# Patient Record
Sex: FEMALE | Race: WHITE | NOT HISPANIC OR LATINO | Employment: UNEMPLOYED | URBAN - METROPOLITAN AREA
[De-identification: names, ages, dates, MRNs, and addresses within clinical notes are randomized per-mention and may not be internally consistent; named-entity substitution may affect disease eponyms.]

---
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
## 2023-01-27 ENCOUNTER — MYC MEDICAL ADVICE (OUTPATIENT)
Dept: NEUROLOGY | Facility: CLINIC | Age: 18
End: 2023-01-27
Payer: COMMERCIAL

## 2023-02-10 ENCOUNTER — VIRTUAL VISIT (OUTPATIENT)
Dept: NEUROLOGY | Facility: CLINIC | Age: 18
End: 2023-02-10
Payer: COMMERCIAL

## 2023-02-10 DIAGNOSIS — G43.009 MIGRAINE WITHOUT AURA AND WITHOUT STATUS MIGRAINOSUS, NOT INTRACTABLE: ICD-10-CM

## 2023-02-10 DIAGNOSIS — R51.9 CHRONIC DAILY HEADACHE: ICD-10-CM

## 2023-02-10 PROCEDURE — 99214 OFFICE O/P EST MOD 30 MIN: CPT | Mod: VID | Performed by: PSYCHIATRY & NEUROLOGY

## 2023-02-10 RX ORDER — METOCLOPRAMIDE 10 MG/1
10 TABLET ORAL 3 TIMES DAILY PRN
Qty: 20 TABLET | Refills: 11 | Status: SHIPPED | OUTPATIENT
Start: 2023-02-10 | End: 2023-11-08

## 2023-02-10 RX ORDER — CEFUROXIME AXETIL 250 MG/1
6 TABLET ORAL
Qty: 9 KIT | Refills: 11 | Status: SHIPPED | OUTPATIENT
Start: 2023-02-10 | End: 2023-08-25

## 2023-02-10 RX ORDER — MEMANTINE HYDROCHLORIDE 5 MG/1
10 TABLET ORAL 2 TIMES DAILY
Qty: 120 TABLET | Refills: 11 | Status: SHIPPED | OUTPATIENT
Start: 2023-02-10 | End: 2023-11-08

## 2023-02-10 ASSESSMENT — HEADACHE IMPACT TEST (HIT 6)
WHEN YOU HAVE HEADACHES HOW OFTEN IS THE PAIN SEVERE: VERY OFTEN
HOW OFTEN HAVE YOU FELT FED UP OR IRRITATED BECAUSE OF YOUR HEADACHES: ALWAYS
WHEN YOU HAVE A HEADACHE HOW OFTEN DO YOU WISH YOU COULD LIE DOWN: ALWAYS
HOW OFTEN HAVE YOU FELT TOO TIRED TO WORK BECAUSE OF YOUR HEADACHES: VERY OFTEN
HIT6 TOTAL SCORE: 70
HOW OFTEN DID HEADACHS LIMIT CONCENTRATION ON WORK OR DAILY ACTIVITY: VERY OFTEN
HOW OFTEN DO HEADACHES LIMIT YOUR DAILY ACTIVITIES: VERY OFTEN

## 2023-02-10 ASSESSMENT — MIGRAINE DISABILITY ASSESSMENT (MIDAS)
HOW MANY DAYS DID YOU MISS WORK OR SCHOOL BECAUSE OF HEADACHES: 4
TOTAL SCORE: 36
HOW OFTEN WERE SOCIAL ACTIVITIES MISSED DUE TO HEADACHES: 4
ON A SCALE FROM 0-10 ON AVERAGE HOW PAINFUL WERE HEADACHES: 6
HOW MANY DAYS IN THE PAST 3 MONTHS HAVE YOU HAD A HEADACHE: 5
HOW MANY DAYS WAS HOUSEWORK PRODUCTIVITY CUT IN HALF DUE TO HEADACHES: 3
HOW MANY DAYS DID YOU NOT DO HOUSEWORK BECAUSE OF HEADACHES: 10
HOW MANY DAYS WAS YOUR PRODUCTIVITY CUT IN HALF BECAUSE OF HEADACHES: 15

## 2023-02-10 NOTE — LETTER
2/10/2023         RE: Marine Garcia  [address]  [place] [address]        Dear Colleague,    Thank you for referring your patient, Marine Garcia, to the [facility] NEUROLOGY CLINICS [facility]. Please see a copy of my visit note below.    Video-Visit Details    Type of service:  Video Visit    Video Start Time (time video started): 1:46pm    Video End Time (time video stopped): 2:02pm    Originating Location (pt. Location): Home        Distant Location (provider location):  On-site    Mode of Communication:  Video Conference via [facility]    Headache Neurology Progress Note  February 10, 2023    Subjective:    Marine Garcia returns for follow up of chronic migraine.  She returns today after the birth of her child in November 2022.  She is currently breast-feeding.  Her headaches have worsened but are less frequent.    Today, she reports that she continues to have headaches occasionally. She has 15/30 headache days per month, with 10/30 severe headaches.     She takes metoclopramide x 2, Advil Migraine as needed for the more severe headache.    She reports memantine and Aimovig have been helpful.     She is starting iron and vitamin D supplementation for deficiencies.    She was dizzy on anti-hypertensives previously. Topiramate stopped working previously. TCAs will be avoided due to caring for a baby.    Daily medications:  Propranolol- TID (20mg, 20mg,10mg)  Topiramate, up to 150mg daily  Verapamil- BID (20mg, 40mg)  Emgality 120 mg monthly  Prednisone- 10 mg  Acetazolamide- 250 mg BID for 5 days, menstrual migraines.  Pramipexole- 0.25mg  Gabapentin- 300mg TID  Aimovig 140mg monthly (currently on)     Fluoxetine and duloxetine- no longer on    Objective:    Vitals: There were no vitals taken for this visit.  General: Cooperative, NAD  Neurologic:  Mental Status: Fully alert, attentive and oriented. Speech clear and fluent.   Cranial Nerves: Facial movements  symmetric.   Motor: No abnormal movements.      Assessment/Plan:   Marine Garcia is a 17 year old woman who returns for follow-up of chronic migraine with possible visual aura.  Overall, her migraine disease was previously well controlled with Aimovig and memantine, but Aimovig was stopped during pregnancy.  She is currently breast-feeding she is experienced worsening of her migraine attacks since the birth of her child.     For acute management,  she will continue over-the-counter treatments, metoclopramide, and could add back sumatriptan as needed.  -I have provided refills today.    Her headache frequency and severity warrant prevention.  We discussed increasing her dose of memantine by 5 mg each week to a maximum dose of 10 mg twice daily.  -I have provided an updated prescription for the higher dose of memantine.  -I advised her to continue holding on Aimovig until she has completed breast-feeding, due to lack of safety data.  -We discussed using magnesium, riboflavin as possible adjunct treatment if needed.  -Cefaly antimigraine device could also be considered as an adjunct.  -I offered a trial of botulinum toxin injections using a chronic migraine protocol.  She would potentially be interested in this if there were a local injector.  She will call her local clinic and see if this is offered.    I will plan to see her back in 6 months to monitor her progress, or sooner if needed.  She will let me know via [employer] if there is any difficulty increasing memantine.    Kalpana Marshall MD  Neurology               Again, thank you for allowing me to participate in the care of your patient.        Sincerely,        Kalpana Marshall MD

## 2023-02-10 NOTE — NURSING NOTE
Is the patient currently in the state of [place]? YES    Visit mode:VIDEO    If the visit is dropped, the patient can be reconnected by: TELEPHONE VISIT: Phone number: [phone]    Will anyone else be joining the visit? NO      How would you like to obtain your AVS? MyChart    Are changes needed to the allergy or medication list? NO    Comments or concerns regarding today's visit:

## 2023-02-10 NOTE — PROGRESS NOTES
Video-Visit Details    Type of service:  Video Visit    Video Start Time (time video started): 1:46pm    Video End Time (time video stopped): 2:02pm    Originating Location (pt. Location): Home        Distant Location (provider location):  On-site    Mode of Communication:  Video Conference via [facility]    Headache Neurology Progress Note  February 10, 2023    Subjective:    Marine Garcia returns for follow up of chronic migraine.  She returns today after the birth of her child in November 2022.  She is currently breast-feeding.  Her headaches have worsened but are less frequent.    Today, she reports that she continues to have headaches occasionally. She has 15/30 headache days per month, with 10/30 severe headaches.     She takes metoclopramide x 2, Advil Migraine as needed for the more severe headache.    She reports memantine and Aimovig have been helpful.     She is starting iron and vitamin D supplementation for deficiencies.    She was dizzy on anti-hypertensives previously. Topiramate stopped working previously. TCAs will be avoided due to caring for a baby.    Daily medications:  Propranolol- TID (20mg, 20mg,10mg)  Topiramate, up to 150mg daily  Verapamil- BID (20mg, 40mg)  Emgality 120 mg monthly  Prednisone- 10 mg  Acetazolamide- 250 mg BID for 5 days, menstrual migraines.  Pramipexole- 0.25mg  Gabapentin- 300mg TID  Aimovig 140mg monthly (currently on)     Fluoxetine and duloxetine- no longer on    Objective:    Vitals: There were no vitals taken for this visit.  General: Cooperative, NAD  Neurologic:  Mental Status: Fully alert, attentive and oriented. Speech clear and fluent.   Cranial Nerves: Facial movements symmetric.   Motor: No abnormal movements.      Assessment/Plan:   Marine Garcia is a 17 year old woman who returns for follow-up of chronic migraine with possible visual aura.  Overall, her migraine disease was previously well controlled with Aimovig  and memantine, but Aimovig was stopped during pregnancy.  She is currently breast-feeding she is experienced worsening of her migraine attacks since the birth of her child.     For acute management,  she will continue over-the-counter treatments, metoclopramide, and could add back sumatriptan as needed.  -I have provided refills today.    Her headache frequency and severity warrant prevention.  We discussed increasing her dose of memantine by 5 mg each week to a maximum dose of 10 mg twice daily.  -I have provided an updated prescription for the higher dose of memantine.  -I advised her to continue holding on Aimovig until she has completed breast-feeding, due to lack of safety data.  -We discussed using magnesium, riboflavin as possible adjunct treatment if needed.  -Cefaly antimigraine device could also be considered as an adjunct.  -I offered a trial of botulinum toxin injections using a chronic migraine protocol.  She would potentially be interested in this if there were a local injector.  She will call her local clinic and see if this is offered.    I will plan to see her back in 6 months to monitor her progress, or sooner if needed.  She will let me know via [employer] if there is any difficulty increasing memantine.    Kalpana Marshall MD  Neurology

## 2023-05-08 ENCOUNTER — HEALTH MAINTENANCE LETTER (OUTPATIENT)
Age: 18
End: 2023-05-08

## 2023-06-29 ENCOUNTER — TELEPHONE (OUTPATIENT)
Dept: NEUROLOGY | Facility: CLINIC | Age: 18
End: 2023-06-29
Payer: COMMERCIAL

## 2023-06-29 NOTE — TELEPHONE ENCOUNTER
Called patient to advise that we need her current insurance card uploaded to her my chart. Patient states that should not be an issue and she will upload it no later than today. Advised patient that this should be just fine and if any other questions we will reach back out.       Brianna Sanchez MA

## 2023-08-17 ENCOUNTER — TELEPHONE (OUTPATIENT)
Dept: NEUROLOGY | Facility: CLINIC | Age: 18
End: 2023-08-17

## 2023-08-17 NOTE — TELEPHONE ENCOUNTER
Prior Authorization Retail Medication Request     Medication/Dose: SUMAtriptan (IMITREX STATDOSE) 6 MG/0.5ML pen injector kit  ICD code (if different than what is on RX):    G43.009  Previously Tried and Failed:  Ibuprofen, Sumatriptan (50 & 100mg) tabs and intranasal spray  Naratriptan- 2.5mg tab  zolmatriptan- 5 mg tab  Eletriptan- 40 mg tab  Frovatriptan- 2.5 mg tab  almotriptan- 12.5 mg tab  Dihydroergotamine 4mg/ml nasal spray  Rizatriptan- 10 mg tab   Rationale:  migraine  She has used sumatriptan injection with good relief of migraine since June 2021    Insurance Name:  [facility]  Insurance ID:  [id number]         Pharmacy Information (if different than what is on RX)  Name:    Phone:

## 2023-08-22 NOTE — TELEPHONE ENCOUNTER
Central Prior Authorization Team   Phone: [phone]    PA Initiation    Medication: SUMATRIPTAN SUCCINATE 6 MG/0.5ML SC SOAJ  Insurance Company: [employer] - Phone [phone] Fax [phone]  Pharmacy Filling the Rx: [facility] [address] [place] [address] [address] [facility]  Filling Pharmacy Phone: [phone]  Filling Pharmacy Fax:    Start Date: 8/22/2023

## 2023-08-25 DIAGNOSIS — G43.009 MIGRAINE WITHOUT AURA AND WITHOUT STATUS MIGRAINOSUS, NOT INTRACTABLE: ICD-10-CM

## 2023-08-25 RX ORDER — CEFUROXIME AXETIL 250 MG/1
6 TABLET ORAL
Qty: 2 ML | Refills: 11 | Status: SHIPPED | OUTPATIENT
Start: 2023-08-25 | End: 2023-11-08

## 2023-08-25 NOTE — TELEPHONE ENCOUNTER
Prior Authorization Not Needed per Insurance     Plan pays for 2ml (4 pens) per 28 days. There is a paid claim at the pharmacy for this on 08/17/2023.   If patient needs more than this, we would need a Letter of Medical Necessity explaining rationale, as a larger Quantity has been denied.      Medication: SUMATRIPTAN SUCCINATE 6 MG/0.5ML SC SOAJ  Insurance Company: [employer] - Phone [phone] Fax [phone]  Expected CoPay:      Pharmacy Filling the Rx: [facility] [address] [place] [address] - [address] [facility]  Pharmacy Notified: No  Patient Notified: No

## 2023-08-28 NOTE — TELEPHONE ENCOUNTER
Hi Debbie,    I resent script for 2 mL (4 injections per month). If she is using more, we will have to do a LMN. She should let us know if this is needed.    Kalpana Marshall MD     LDVM on descript VM regarding insurance covering only 4 pends per month.     RN advised pt to call us back or send us a myChart if she is requiring more than that.     Maribel STEVENS RN, BSN  [facility]

## 2023-08-29 ENCOUNTER — TELEPHONE (OUTPATIENT)
Dept: NEUROLOGY | Facility: CLINIC | Age: 18
End: 2023-08-29

## 2023-08-29 NOTE — TELEPHONE ENCOUNTER
Prior Authorization Retail Medication Request    Medication/Dose: SUMAtriptan (IMITREX STATDOSE) 6 MG/0.5ML pen injector kit   ICD code (if different than what is on RX):    Previously Tried and Failed:  See Chart  Rationale:  see Chart    Insurance Name:  [employer]  Insurance ID:  [id number]      Pharmacy Information (if different than what is on RX)  Name:    Phone:

## 2023-08-31 NOTE — TELEPHONE ENCOUNTER
Central Prior Authorization Team   Phone: [phone]    PA Initiation    Medication: SUMATRIPTAN SUCCINATE 6 MG/0.5ML SC SOAJ  Insurance Company: [employer] - Phone [phone] Fax [phone]  Pharmacy Filling the Rx: [facility] [address] [place] [address] [address] [facility]  Filling Pharmacy Phone: [phone]  Filling Pharmacy Fax:    Start Date: 8/31/2023

## 2023-09-26 NOTE — TELEPHONE ENCOUNTER
Called pharmacy, spoke to pharmacist. No P/A is needed. They just had to switch to Brand Name Imitrex and rx processed. No P/A needed.

## 2023-09-28 DIAGNOSIS — G43.009 MIGRAINE WITHOUT AURA AND WITHOUT STATUS MIGRAINOSUS, NOT INTRACTABLE: ICD-10-CM

## 2023-09-28 RX ORDER — ERENUMAB-AOOE 140 MG/ML
140 INJECTION, SOLUTION SUBCUTANEOUS
Qty: 1 ML | Refills: 11 | Status: SHIPPED | OUTPATIENT
Start: 2023-09-28 | End: 2023-11-08

## 2023-09-29 ENCOUNTER — TELEPHONE (OUTPATIENT)
Dept: NEUROLOGY | Facility: CLINIC | Age: 18
End: 2023-09-29
Payer: COMMERCIAL

## 2023-09-29 NOTE — TELEPHONE ENCOUNTER
Prior Authorization Retail Medication Request    Medication/Dose: AIMOVIG 140 MG/ML injection 1 mL  ICD code (if different than what is on RX):    Previously Tried and Failed:  Sumatriptan (50 & 100mg) tabs and intranasal spray  Naratriptan- 2.5mg tab  zolmatriptan- 5 mg tab  Eletriptan- 40 mg tab  Frovatriptan- 2.5 mg tab  almotriptan- 12.5 mg tab  Dihydroergotamine 4mg/ml nasal spray  Rizatriptan- 10 mg tab   Rationale:  migraine - restarting Aimovig     Insurance Name:  [facility]   Insurance ID:  [id number]       Pharmacy Information (if different than what is on RX)  Name:    Phone:

## 2023-10-04 NOTE — TELEPHONE ENCOUNTER
Central Prior Authorization Team   Phone: [phone]    PA Initiation    Medication: AIMOVIG 140 MG/ML injection 1 mL  Insurance Company: [facility] - Phone [phone] Fax [phone]  Pharmacy Filling the Rx: [facility] [address] [place] [address] [address] [facility]  Filling Pharmacy Phone: [phone]  Filling Pharmacy Fax:    Start Date: 10/4/2023

## 2023-10-05 NOTE — TELEPHONE ENCOUNTER
PRIOR AUTHORIZATION DENIED    Medication: AIMOVIG 140 MG/ML injection 1 mL-PA DENIED    Denial Date: 10/4/2023    Denial Rational:  Insurance states that patient must tried/failed Two preferred alternatives: Emgality and Ajovy.           Appeal Information:

## 2023-10-06 ENCOUNTER — TELEPHONE (OUTPATIENT)
Dept: NEUROLOGY | Facility: CLINIC | Age: 18
End: 2023-10-06
Payer: COMMERCIAL

## 2023-10-06 NOTE — TELEPHONE ENCOUNTER
Per Dr. Marshall, pt to be seen virtually to discuss medication switch.  Sent pt a my chart message.

## 2023-10-06 NOTE — TELEPHONE ENCOUNTER
Please schedule pt with Dr. Marshall virtually on Wednesday November 8th at 12:30. Pt is aware.  Once scheduled, please remove hold. Thank you.

## 2023-11-04 ASSESSMENT — HEADACHE IMPACT TEST (HIT 6)
HOW OFTEN DO HEADACHES LIMIT YOUR DAILY ACTIVITIES: VERY OFTEN
HOW OFTEN DID HEADACHS LIMIT CONCENTRATION ON WORK OR DAILY ACTIVITY: ALWAYS
WHEN YOU HAVE A HEADACHE HOW OFTEN DO YOU WISH YOU COULD LIE DOWN: ALWAYS
HIT6 TOTAL SCORE: 74
WHEN YOU HAVE HEADACHES HOW OFTEN IS THE PAIN SEVERE: VERY OFTEN
HOW OFTEN HAVE YOU FELT TOO TIRED TO WORK BECAUSE OF YOUR HEADACHES: ALWAYS
HOW OFTEN HAVE YOU FELT FED UP OR IRRITATED BECAUSE OF YOUR HEADACHES: ALWAYS

## 2023-11-08 ENCOUNTER — VIRTUAL VISIT (OUTPATIENT)
Dept: NEUROLOGY | Facility: CLINIC | Age: 18
End: 2023-11-08
Payer: COMMERCIAL

## 2023-11-08 ENCOUNTER — TELEPHONE (OUTPATIENT)
Dept: NEUROLOGY | Facility: CLINIC | Age: 18
End: 2023-11-08

## 2023-11-08 DIAGNOSIS — G43.009 MIGRAINE WITHOUT AURA AND WITHOUT STATUS MIGRAINOSUS, NOT INTRACTABLE: ICD-10-CM

## 2023-11-08 DIAGNOSIS — R51.9 CHRONIC DAILY HEADACHE: ICD-10-CM

## 2023-11-08 PROCEDURE — 99214 OFFICE O/P EST MOD 30 MIN: CPT | Mod: VID | Performed by: PSYCHIATRY & NEUROLOGY

## 2023-11-08 RX ORDER — ONDANSETRON 4 MG/1
4 TABLET, ORALLY DISINTEGRATING ORAL EVERY 6 HOURS PRN
Qty: 20 TABLET | Refills: 11 | Status: SHIPPED | OUTPATIENT
Start: 2023-11-08

## 2023-11-08 RX ORDER — MEMANTINE HYDROCHLORIDE 10 MG/1
10 TABLET ORAL 2 TIMES DAILY
Qty: 60 TABLET | Refills: 11 | Status: SHIPPED | OUTPATIENT
Start: 2023-11-08

## 2023-11-08 RX ORDER — CEFUROXIME AXETIL 250 MG/1
6 TABLET ORAL
Qty: 9 ML | Refills: 11 | Status: SHIPPED | OUTPATIENT
Start: 2023-11-08

## 2023-11-08 RX ORDER — FREMANEZUMAB-VFRM 225 MG/1.5ML
1.5 INJECTION SUBCUTANEOUS
Qty: 1.5 ML | Refills: 11 | Status: SHIPPED | OUTPATIENT
Start: 2023-11-08

## 2023-11-08 ASSESSMENT — PATIENT HEALTH QUESTIONNAIRE - PHQ9: SUM OF ALL RESPONSES TO PHQ QUESTIONS 1-9: 21

## 2023-11-08 NOTE — TELEPHONE ENCOUNTER
Prior Authorization Retail Medication Request    Medication/Dose: Fremanezumab-vfrm (AJOVY) 225 MG/1.5ML SOAJ   ICD code (if different than what is on RX):  G43.009  Previously Tried and Failed  Aimovig was previously helpful, but is not covered by insurance   Rationale:  chronic headache    Insurance Name:  [facility]  Insurance ID:  [id number]      Pharmacy Information (if different than what is on RX)  Name:    Phone:

## 2023-11-08 NOTE — NURSING NOTE
Is the patient currently in the state of [place]? YES    Visit mode:VIDEO    If the visit is dropped, the patient can be reconnected by: TELEPHONE VISIT: Phone number:   Telephone Information:   Mobile [phone]       Will anyone else be joining the visit? NO  (If patient encounters technical issues they should call [phone]    How would you like to obtain your AVS? MyChart    Are changes needed to the allergy or medication list? Pt stated no med changes    Reason for visit: Video Visit (Follow-up )    Clarisa SO

## 2023-11-08 NOTE — LETTER
11/8/2023       RE: Marine Garcia  [address]  [place] [address]     Dear Colleague,    Thank you for referring your patient, Marine Garcia, to the [facility] NEUROLOGY CLINIC [place] at [facility]. Please see a copy of my visit note below.      [facility]    Headache Neurology Progress Note  November 8, 2023    Subjective:    Marine Garcia returns for follow up of chronic migraine.     Headaches and migraine are returning. She is now working full time at an [employer] center. She is now longer breastfeeding.     Aimovig was previously helpful, but is not covered by insurance.     Migraines are typically left sided, but can be right sided lately. She estimates she has 10/30 headache days per month, with 5/30 migraine days per month.    She also reports tinnitus, worse when in quiet settings.    Objective:    Vitals: There were no vitals taken for this visit.  General: Cooperative, NAD  Neurologic:  Mental Status: Fully alert, attentive and oriented. Speech clear and fluent.   Cranial Nerves: Facial movements symmetric.   Motor: No abnormal movements.      Assessment/Plan:   Marine Garcia is a 18 year old woman who returns for follow-up of chronic migraine with possible visual aura.  Overall, her migraine disease was previously well controlled with Aimovig and memantine, but Aimovig is no longer covered.  Currently, her migraine disease is not controlled.     For acute management,  she will continue over-the-counter treatments, ondansetron, sumatriptan subcutaneous injection.  -I have provided refills today.     Her headache frequency and severity warrant prevention.  We discussed continuing memantine 10 mg twice daily.  -I switched her memantine to the 10 mg tablet for convenience.  -I recommended Ajovy subcutaneous injection every 30 days.  We will attempt to get preauthorization.  If not covered, we will try for other CGRP  inhibitors, such as Nurtec or Qulipta or Vyepti.  -Cefaly antimigraine device could also be considered as an adjunct.    Regarding tinnitus, I have asked her to get an updated eye exam to ensure that this is not a sign of increased intracranial pressure.  She reports that she has an upcoming ophthalmologic exam already scheduled.  She will let me know if this shows signs of papilledema.     I will plan to see her back in 3 to 6 months to monitor progress.          Again, thank you for allowing me to participate in the care of your patient.      Sincerely,    Kalpana Marshall MD

## 2023-11-08 NOTE — PROGRESS NOTES
Virtual Visit Details    Type of service:  Video Visit   Video Start Time: 12:34 PM  Video End Time:12:49 PM    Originating Location (pt. Location): Home    Distant Location (provider location):  Off-site  Platform used for Video Visit: [facility]    Headache Neurology Progress Note  November 8, 2023    Subjective:    Marine Garcia returns for follow up of chronic migraine.     Headaches and migraine are returning. She is now working full time at an [employer] center. She is now longer breastfeeding.     Aimovig was previously helpful, but is not covered by insurance.     Migraines are typically left sided, but can be right sided lately. She estimates she has 10/30 headache days per month, with 5/30 migraine days per month.    She also reports tinnitus, worse when in quiet settings.    Objective:    Vitals: There were no vitals taken for this visit.  General: Cooperative, NAD  Neurologic:  Mental Status: Fully alert, attentive and oriented. Speech clear and fluent.   Cranial Nerves: Facial movements symmetric.   Motor: No abnormal movements.      Assessment/Plan:   Marine Garcia is a 18 year old woman who returns for follow-up of chronic migraine with possible visual aura.  Overall, her migraine disease was previously well controlled with Aimovig and memantine, but Aimovig is no longer covered.  Currently, her migraine disease is not controlled.     For acute management,  she will continue over-the-counter treatments, ondansetron, sumatriptan subcutaneous injection.  -I have provided refills today.     Her headache frequency and severity warrant prevention.  We discussed continuing memantine 10 mg twice daily.  -I switched her memantine to the 10 mg tablet for convenience.  -I recommended Ajovy subcutaneous injection every 30 days.  We will attempt to get preauthorization.  If not covered, we will try for other CGRP inhibitors, such as Nurtec or Qulipta or Vyepti.  -Cefaly antimigraine  device could also be considered as an adjunct.    Regarding tinnitus, I have asked her to get an updated eye exam to ensure that this is not a sign of increased intracranial pressure.  She reports that she has an upcoming ophthalmologic exam already scheduled.  She will let me know if this shows signs of papilledema.     I will plan to see her back in 3 to 6 months to monitor progress.    Kalpana Marshall MD  Neurology

## 2023-11-11 NOTE — TELEPHONE ENCOUNTER
Central Prior Authorization Team   Phone: [phone]    PA Initiation    Medication: AJOVY 225 MG/1.5ML SC SOAJ  Insurance Company: [employer] - Phone [phone] Fax [phone]  Pharmacy Filling the Rx: [facility] [address] [place] [address] [address] [facility]  Filling Pharmacy Phone: [phone]  Filling Pharmacy Fax:    Start Date: 11/11/2023

## 2023-11-13 NOTE — TELEPHONE ENCOUNTER
Prior Authorization Approval    Medication: AJOVY 225 MG/1.5ML SC SOAJ  Authorization Effective Date: 11/11/2023  Authorization Expiration Date: 5/9/2024  Approved Dose/Quantity:   Reference #:     Insurance Company: [employer] Phone [phone] Fax [phone]  Expected CoPay: $    CoPay Card Available:      Financial Assistance Needed:   Which Pharmacy is filling the prescription: [facility] [address] [place] [address] [address] [facility]  Pharmacy Notified: Yes  Patient Notified: **Instructed pharmacy to notify patient when script is ready to /ship.**

## 2023-11-29 DIAGNOSIS — G43.009 MIGRAINE WITHOUT AURA AND WITHOUT STATUS MIGRAINOSUS, NOT INTRACTABLE: Primary | ICD-10-CM

## 2023-11-29 RX ORDER — RIBOFLAVIN (VITAMIN B2) 400 MG
400 TABLET ORAL DAILY
Qty: 30 TABLET | Refills: 11 | Status: SHIPPED | OUTPATIENT
Start: 2023-11-29

## 2024-01-17 ENCOUNTER — VIRTUAL VISIT (OUTPATIENT)
Dept: NEUROLOGY | Facility: CLINIC | Age: 19
End: 2024-01-17
Payer: COMMERCIAL

## 2024-01-17 VITALS — BODY MASS INDEX: 38.24 KG/M2 | WEIGHT: 224 LBS | HEIGHT: 64 IN

## 2024-01-17 DIAGNOSIS — G43.009 MIGRAINE WITHOUT AURA AND WITHOUT STATUS MIGRAINOSUS, NOT INTRACTABLE: Primary | ICD-10-CM

## 2024-01-17 PROCEDURE — 99214 OFFICE O/P EST MOD 30 MIN: CPT | Mod: 95 | Performed by: PSYCHIATRY & NEUROLOGY

## 2024-01-17 RX ORDER — METOCLOPRAMIDE 10 MG/1
10 TABLET ORAL 3 TIMES DAILY PRN
Qty: 20 TABLET | Refills: 11 | Status: SHIPPED | OUTPATIENT
Start: 2024-01-17

## 2024-01-17 ASSESSMENT — MIGRAINE DISABILITY ASSESSMENT (MIDAS)
HOW MANY DAYS WAS YOUR PRODUCTIVITY CUT IN HALF BECAUSE OF HEADACHES: 15
HOW OFTEN WERE SOCIAL ACTIVITIES MISSED DUE TO HEADACHES: 3
HOW MANY DAYS DID YOU MISS WORK OR SCHOOL BECAUSE OF HEADACHES: 5
HOW MANY DAYS DID YOU NOT DO HOUSEWORK BECAUSE OF HEADACHES: 10
TOTAL SCORE: 43
HOW MANY DAYS WAS HOUSEWORK PRODUCTIVITY CUT IN HALF DUE TO HEADACHES: 10
ON A SCALE FROM 0-10 ON AVERAGE HOW PAINFUL WERE HEADACHES: 7
HOW MANY DAYS IN THE PAST 3 MONTHS HAVE YOU HAD A HEADACHE: 16

## 2024-01-17 ASSESSMENT — PAIN SCALES - GENERAL: PAINLEVEL: NO PAIN (0)

## 2024-01-17 ASSESSMENT — HEADACHE IMPACT TEST (HIT 6)
HIT6 TOTAL SCORE: 72
HOW OFTEN DO HEADACHES LIMIT YOUR DAILY ACTIVITIES: VERY OFTEN
HOW OFTEN DID HEADACHS LIMIT CONCENTRATION ON WORK OR DAILY ACTIVITY: ALWAYS
WHEN YOU HAVE HEADACHES HOW OFTEN IS THE PAIN SEVERE: VERY OFTEN
HOW OFTEN HAVE YOU FELT TOO TIRED TO WORK BECAUSE OF YOUR HEADACHES: ALWAYS
WHEN YOU HAVE A HEADACHE HOW OFTEN DO YOU WISH YOU COULD LIE DOWN: ALWAYS
HOW OFTEN HAVE YOU FELT FED UP OR IRRITATED BECAUSE OF YOUR HEADACHES: VERY OFTEN

## 2024-01-17 ASSESSMENT — PATIENT HEALTH QUESTIONNAIRE - PHQ9: SUM OF ALL RESPONSES TO PHQ QUESTIONS 1-9: 9

## 2024-01-17 NOTE — LETTER
"1/17/2024       RE: Marine Garcia  [address]  [place] [address]       Dear Colleague,    Thank you for referring your patient, Marine Garcia, to the [facility] NEUROLOGY CLINIC [place] at [facility]. Please see a copy of my visit note below.      [facility]    Headache Neurology Progress Note  January 17, 2024    Subjective:    Marine Garcia returns for follow up of chronic migraine.    She reports that headaches worsened in the setting of stopping Namenda. She reports headaches lasted two days instead of one day.     She reports 10-15/30 headache days per month, with 6-10/30 severe headache days per month. Severe headaches are lasting two days.    She has restarted Namenda; she had stopped this due to pregnancy, but is missing too much work, so we made a decision to restart.    She takes Reglan or Tylenol or both. Cefaly was not affordable.    She is also pregnant. She is at 12 weeks. With last pregnancy, she had 4 migraine attacks in total.    Her job is requesting a letter explaining headache.    Objective:    Vitals: Ht 1.619 m (5' 3.75\")   Wt 101.6 kg (224 lb)   BMI 38.75 kg/m    General: Cooperative, NAD  Neurologic:  Mental Status: Fully alert, attentive and oriented. Speech clear and fluent.   Cranial Nerves: Facial movements symmetric.   Motor: No abnormal movements.      Assessment/Plan:   Marine Garcia is a 18 year old woman who returns for follow-up of chronic migraine with possible visual aura.  Overall, her migraine disease was previously well controlled with Aimovig and memantine, but Aimovig was no longer covered. Now, she is pregnant, and we trialed without preventive therapies, however, headaches remained frequent and severe. She is missing work due to headache about 6-10 days per month. We decided to resume Namenda daily.    She will continue Namenda daily for headache prevention. We are hopeful this will be " effective again and allow her to continue working during pregnancy.    -If not improving or worsening, will get updated MRI/MRV brain for further evaluation. She is noticing improvement at this time, so will hold off.     For acute management,  she will continue acetaminophen and metoclopramide as needed.   -I have provided refills of metoclopramide today.     Her headache frequency and severity warrant prevention.  We discussed restarting memantine 10 mg twice daily.  -I recommended she hold Ajovy subcutaneous injection every 30 days.  This could be reconsidered after pregnancy.  If not covered, we will try for other CGRP inhibitors, such as Nurtec or Qulipta or Vyepti.  -Cefaly antimigraine device is not affordable.     I will plan to see her back in 4 months to monitor progress.        Again, thank you for allowing me to participate in the care of your patient.      Sincerely,    Kalpana Marshall MD

## 2024-01-17 NOTE — NURSING NOTE
Is the patient currently in the state of [place]? YES    Visit mode:VIDEO    If the visit is dropped, the patient can be reconnected by: VIDEO VISIT: Text to cell phone:   Telephone Information:   Mobile [phone]       Will anyone else be joining the visit? NO  (If patient encounters technical issues they should call [phone] :[phone])    How would you like to obtain your AVS? MyChart    Are changes needed to the allergy or medication list? No    Reason for visit: Follow Up    Alicia Gonzalez VVF    Depression Response    Patient completed the PHQ-9 assessment for depression and scored >9? Yes  Question 9 on the PHQ-9 was positive for suicidality? NO  Does patient have current mental health provider? Yes    Is this a virtual visit? Yes   Does patient have suicidal ideation (positive question 9)? No - offer to place Mental Health Referral.  Patient declined referral/not needed    I personally notified the following: visit provider

## 2024-01-17 NOTE — PROGRESS NOTES
"Virtual Visit Details    Type of service:  Video Visit   Video Start Time: 9:10 AM  Video End Time:9:21 AM    Originating Location (pt. Location): Home    Distant Location (provider location):  Off-site  Platform used for Video Visit: [facility]    Headache Neurology Progress Note  January 17, 2024    Subjective:    Marine Garcia returns for follow up of chronic migraine.    She reports that headaches worsened in the setting of stopping Namenda. She reports headaches lasted two days instead of one day.     She reports 10-15/30 headache days per month, with 6-10/30 severe headache days per month. Severe headaches are lasting two days.    She has restarted Namenda; she had stopped this due to pregnancy, but is missing too much work, so we made a decision to restart.    She takes Reglan or Tylenol or both. Cefaly was not affordable.    She is also pregnant. She is at 12 weeks. With last pregnancy, she had 4 migraine attacks in total.    Her job is requesting a letter explaining headache.    Objective:    Vitals: Ht 1.619 m (5' 3.75\")   Wt 101.6 kg (224 lb)   BMI 38.75 kg/m    General: Cooperative, NAD  Neurologic:  Mental Status: Fully alert, attentive and oriented. Speech clear and fluent.   Cranial Nerves: Facial movements symmetric.   Motor: No abnormal movements.      Assessment/Plan:   Marine Garcia is a 18 year old woman who returns for follow-up of chronic migraine with possible visual aura.  Overall, her migraine disease was previously well controlled with Aimovig and memantine, but Aimovig was no longer covered. Now, she is pregnant, and we trialed without preventive therapies, however, headaches remained frequent and severe. She is missing work due to headache about 6-10 days per month. We decided to resume Namenda daily.    She will continue Namenda daily for headache prevention. We are hopeful this will be effective again and allow her to continue working during " pregnancy.    -If not improving or worsening, will get updated MRI/MRV brain for further evaluation. She is noticing improvement at this time, so will hold off.     For acute management,  she will continue acetaminophen and metoclopramide as needed.   -I have provided refills of metoclopramide today.     Her headache frequency and severity warrant prevention.  We discussed restarting memantine 10 mg twice daily.  -I recommended she hold Ajovy subcutaneous injection every 30 days.  This could be reconsidered after pregnancy.  If not covered, we will try for other CGRP inhibitors, such as Nurtec or Qulipta or Vyepti.  -Cefaly antimigraine device is not affordable.     I will plan to see her back in 4 months to monitor progress.    Kalpana Marshall MD  Neurology

## 2024-01-26 ENCOUNTER — MYC MEDICAL ADVICE (OUTPATIENT)
Dept: NEUROLOGY | Facility: CLINIC | Age: 19
End: 2024-01-26
Payer: COMMERCIAL

## 2024-01-26 DIAGNOSIS — R51.9 PREGNANCY HEADACHE IN SECOND TRIMESTER: ICD-10-CM

## 2024-01-26 DIAGNOSIS — O26.892 PREGNANCY HEADACHE IN SECOND TRIMESTER: ICD-10-CM

## 2024-01-26 DIAGNOSIS — G43.009 MIGRAINE WITHOUT AURA AND WITHOUT STATUS MIGRAINOSUS, NOT INTRACTABLE: Primary | ICD-10-CM

## 2024-01-26 NOTE — PROGRESS NOTES
Patient with chronic headache, worsened and changes in quality during pregnancy. Recommend MRI brain and MRV head for further evaluation.    MD Teresa

## 2024-01-30 NOTE — TELEPHONE ENCOUNTER
MRI/MRV orders faxed to [facility] Department #[phone] per pt request.

## 2024-02-29 DIAGNOSIS — J34.9 SINUS DISEASE: Primary | ICD-10-CM

## 2024-02-29 NOTE — PROGRESS NOTES
Patient with progressively worsened headache in pregnancy.  MRI brain wo contrast with significant sinus disease per my read, awaiting radiology report from outside center.   Placed ENT referral, priority.    MD Teresa

## 2024-04-17 NOTE — NURSING NOTE
NREQQI [[id number]]  Chief Complaint   Patient presents with     Consult For     Headaches      Initial There were no vitals taken for this visit. There is no height or weight on file to calculate BMI.  Medication Reconciliation: complete     normal appearance , without tenderness upon palpation , no deformities , trachea midline

## 2024-04-19 DIAGNOSIS — G43.009 MIGRAINE WITHOUT AURA AND WITHOUT STATUS MIGRAINOSUS, NOT INTRACTABLE: Primary | ICD-10-CM

## 2024-04-19 RX ORDER — CYCLOBENZAPRINE HCL 10 MG
10 TABLET ORAL 3 TIMES DAILY PRN
Qty: 10 TABLET | Refills: 1 | Status: SHIPPED | OUTPATIENT
Start: 2024-04-19

## 2024-05-01 ASSESSMENT — HEADACHE IMPACT TEST (HIT 6)
WHEN YOU HAVE HEADACHES HOW OFTEN IS THE PAIN SEVERE: VERY OFTEN
WHEN YOU HAVE A HEADACHE HOW OFTEN DO YOU WISH YOU COULD LIE DOWN: ALWAYS
HOW OFTEN HAVE YOU FELT FED UP OR IRRITATED BECAUSE OF YOUR HEADACHES: VERY OFTEN
HOW OFTEN DO HEADACHES LIMIT YOUR DAILY ACTIVITIES: ALWAYS
HOW OFTEN DID HEADACHS LIMIT CONCENTRATION ON WORK OR DAILY ACTIVITY: ALWAYS
HIT6 TOTAL SCORE: 72
HOW OFTEN HAVE YOU FELT TOO TIRED TO WORK BECAUSE OF YOUR HEADACHES: VERY OFTEN

## 2024-05-01 ASSESSMENT — MIGRAINE DISABILITY ASSESSMENT (MIDAS)
TOTAL SCORE: 49
HOW MANY DAYS DID YOU MISS WORK OR SCHOOL BECAUSE OF HEADACHES: 6
HOW MANY DAYS DID YOU NOT DO HOUSEWORK BECAUSE OF HEADACHES: 15
HOW MANY DAYS IN THE PAST 3 MONTHS HAVE YOU HAD A HEADACHE: 18
HOW MANY DAYS WAS HOUSEWORK PRODUCTIVITY CUT IN HALF DUE TO HEADACHES: 10
HOW MANY DAYS WAS YOUR PRODUCTIVITY CUT IN HALF BECAUSE OF HEADACHES: 15
HOW OFTEN WERE SOCIAL ACTIVITIES MISSED DUE TO HEADACHES: 3
ON A SCALE FROM 0-10 ON AVERAGE HOW PAINFUL WERE HEADACHES: 8

## 2024-05-08 ENCOUNTER — VIRTUAL VISIT (OUTPATIENT)
Dept: NEUROLOGY | Facility: CLINIC | Age: 19
End: 2024-05-08
Payer: COMMERCIAL

## 2024-05-08 VITALS — BODY MASS INDEX: 41.83 KG/M2 | WEIGHT: 245 LBS | HEIGHT: 64 IN

## 2024-05-08 DIAGNOSIS — R51.9 PREGNANCY HEADACHE IN SECOND TRIMESTER: ICD-10-CM

## 2024-05-08 DIAGNOSIS — G43.719 INTRACTABLE CHRONIC MIGRAINE WITHOUT AURA AND WITHOUT STATUS MIGRAINOSUS: ICD-10-CM

## 2024-05-08 DIAGNOSIS — R51.9 CHRONIC DAILY HEADACHE: Primary | ICD-10-CM

## 2024-05-08 DIAGNOSIS — O26.892 PREGNANCY HEADACHE IN SECOND TRIMESTER: ICD-10-CM

## 2024-05-08 PROCEDURE — 99214 OFFICE O/P EST MOD 30 MIN: CPT | Mod: 95 | Performed by: PSYCHIATRY & NEUROLOGY

## 2024-05-08 PROCEDURE — G2211 COMPLEX E/M VISIT ADD ON: HCPCS | Performed by: PSYCHIATRY & NEUROLOGY

## 2024-05-08 ASSESSMENT — PAIN SCALES - GENERAL: PAINLEVEL: MILD PAIN (2)

## 2024-05-08 NOTE — PROGRESS NOTES
"Virtual Visit Details    Type of service:  Video Visit   Video Start Time: 8:07 AM  Video End Time: 8:28 AM    Originating Location (pt. Location): Home    Distant Location (provider location):  Off-site  Platform used for Video Visit: [facility]    Headache Neurology Progress Note  May 8, 2024    Subjective:    Marine Garcia returns for follow up of chronic migraine. She is currently pregnant.     She has been diagnosed with gestational hypertension. She started nifedipine, but she stopped due to headache. She tried labetalol next, but she is dizzy, lightheaded, and has head tingling.     She is taking Namenda daily, and Tylenol and sumatriptan injection as needed for headache.    She is not taking magnesium or riboflavin due to cost.    She has Flexeril, but hasn't tried it yet.     Reglan was not helpful.    She had another \"black out\" migraine with severe pain. She described this as dissociation, not loss of consciousness. This lasted about 2 hours, when pain was at its worst.    Headaches continue daily, lasting up to 3 days at a time. She has 30/30 headache days per month, with 15/30 severe headache days per month.    She had sinus disease seen on MRI brain; she hasn't been able to get in contact with ENT. She would like to ask insurance about options in [place] or [place].    She is currently working 4.5 days per week. She has been put on light work duty due to blood pressure concerns.     Objective:    Vitals: Ht 1.619 m (5' 3.75\")   Wt 111.1 kg (245 lb)   BMI 42.38 kg/m    General: Cooperative, NAD  Neurologic:  Mental Status: Fully alert, attentive and oriented. Speech clear and fluent.   Cranial Nerves: Facial movements symmetric.   Motor: No abnormal movements.      Pertinent Investigations:          11/4/2023    12:40 PM 1/17/2024     8:41 AM 5/1/2024    10:12 AM   HIT-6   When you have headaches, how often is the pain severe 11 11 11   How often do headaches limit " your ability to do usual daily activities including household work, work, school, or social activities? 11 11 13   When you have a headache, how often do you wish you could lie down? 13 13 13   In the past 4 weeks, how often have you felt too tired to do work or daily activities because of your headaches 13 13 11   In the past 4 weeks, how often have you felt fed up or irritated because of your headaches 13 11 11   In the past 4 weeks, how often did headaches limit your ability to concentrate on work or daily activities 13 13 13   HIT-6 Total Score 74 72 72           11/4/2023    12:42 PM 1/17/2024     8:45 AM 5/1/2024    10:15 AM   MIDAS - in the past three months:   On how many days did you miss work or school because of your headaches? 10 5 6   How many days was your productivity at work or school reduced by half or more because of your headaches? 12 15 15   On how many days did you not do household work because of your headaches? 12 10 15   How many days was your productivity in household work reduced by half or more because of your headaches? 10 10 10   On how many days did you miss family, social, or leisure activities because of your headaches? 5 3 3   On how many days did you have a headache? 15 16 18   On a scale of 0-10, on average how painful were these headaches? 7 7 8   MIDAS Score 49 (IV - Severe Disability) 43 (IV - Severe Disability) 49 (IV - Severe Disability)        Assessment/Plan:   Marine Garcia is a 19 year old woman who returns for follow-up of chronic migraine with possible visual aura.  Overall, her migraine disease was previously well controlled with Aimovig and memantine, but now is uncontrolled in the setting of pregnancy and gestational hypertension. We decided to continue Namenda daily, and are hopeful that blood pressure control will also help headache.     She will continue Namenda daily for headache prevention. She has been able to work again during pregnancy with this, although this  is still challenging and she is on light duty due to hypertension.  -We discussed Botox injections if needed. Risks and benefits were discussed. She will let me know if we need to add these on.     For acute management,  she will continue acetaminophen and sumatriptan. She has cyclobenzaprine available as a rescue treatment, if needed.    The longitudinal plan of care for Marine was addressed during this visit. Due to the added complexity in care, I will continue to support Marine in the subsequent management of this condition(s) and with the ongoing continuity of care of this condition(s).    Kalpana Marshall MD  Neurology

## 2024-05-08 NOTE — NURSING NOTE
Is the patient currently in the state of [place]? YES    Visit mode:VIDEO    If the visit is dropped, the patient can be reconnected by: VIDEO VISIT: Text to cell phone:   Telephone Information:   Mobile [phone]       Will anyone else be joining the visit? NO  (If patient encounters technical issues they should call [phone])    How would you like to obtain your AVS? MyChart    Are changes needed to the allergy or medication list? No    Are refills needed on medications prescribed by this physician? NO    Reason for visit: Follow Up    Alicia SO

## 2024-05-08 NOTE — LETTER
"5/8/2024       RE: Marine Garcia  [address]  [place] [address]       Dear Colleague,    Thank you for referring your patient, Marine Garcia, to the [facility] NEUROLOGY CLINIC [place] at [facility]. Please see a copy of my visit note below.    [facility]    Headache Neurology Progress Note  May 8, 2024    Subjective:    Marine Garcia returns for follow up of chronic migraine. She is currently pregnant.     She has been diagnosed with gestational hypertension. She started nifedipine, but she stopped due to headache. She tried labetalol next, but she is dizzy, lightheaded, and has head tingling.     She is taking Namenda daily, and Tylenol and sumatriptan injection as needed for headache.    She is not taking magnesium or riboflavin due to cost.    She has Flexeril, but hasn't tried it yet.     Reglan was not helpful.    She had another \"black out\" migraine with severe pain. She described this as dissociation, not loss of consciousness. This lasted about 2 hours, when pain was at its worst.    Headaches continue daily, lasting up to 3 days at a time. She has 30/30 headache days per month, with 15/30 severe headache days per month.    She had sinus disease seen on MRI brain; she hasn't been able to get in contact with ENT. She would like to ask insurance about options in [place] or [place].    She is currently working 4.5 days per week. She has been put on light work duty due to blood pressure concerns.     Objective:    Vitals: Ht 1.619 m (5' 3.75\")   Wt 111.1 kg (245 lb)   BMI 42.38 kg/m    General: Cooperative, NAD  Neurologic:  Mental Status: Fully alert, attentive and oriented. Speech clear and fluent.   Cranial Nerves: Facial movements symmetric.   Motor: No abnormal movements.      Pertinent Investigations:          11/4/2023    12:40 PM 1/17/2024     8:41 AM 5/1/2024    10:12 AM   HIT-6   When you have headaches, how often " is the pain severe 11 11 11   How often do headaches limit your ability to do usual daily activities including household work, work, school, or social activities? 11 11 13   When you have a headache, how often do you wish you could lie down? 13 13 13   In the past 4 weeks, how often have you felt too tired to do work or daily activities because of your headaches 13 13 11   In the past 4 weeks, how often have you felt fed up or irritated because of your headaches 13 11 11   In the past 4 weeks, how often did headaches limit your ability to concentrate on work or daily activities 13 13 13   HIT-6 Total Score 74 72 72           11/4/2023    12:42 PM 1/17/2024     8:45 AM 5/1/2024    10:15 AM   MIDAS - in the past three months:   On how many days did you miss work or school because of your headaches? 10 5 6   How many days was your productivity at work or school reduced by half or more because of your headaches? 12 15 15   On how many days did you not do household work because of your headaches? 12 10 15   How many days was your productivity in household work reduced by half or more because of your headaches? 10 10 10   On how many days did you miss family, social, or leisure activities because of your headaches? 5 3 3   On how many days did you have a headache? 15 16 18   On a scale of 0-10, on average how painful were these headaches? 7 7 8   MIDAS Score 49 (IV - Severe Disability) 43 (IV - Severe Disability) 49 (IV - Severe Disability)        Assessment/Plan:   Marine Garcia is a 19 year old woman who returns for follow-up of chronic migraine with possible visual aura.  Overall, her migraine disease was previously well controlled with Aimovig and memantine, but now is uncontrolled in the setting of pregnancy and gestational hypertension. We decided to continue Namenda daily, and are hopeful that blood pressure control will also help headache.     She will continue Namenda daily for headache prevention. She has been  able to work again during pregnancy with this, although this is still challenging and she is on light duty due to hypertension.  -We discussed Botox injections if needed. Risks and benefits were discussed. She will let me know if we need to add these on.     For acute management,  she will continue acetaminophen and sumatriptan. She has cyclobenzaprine available as a rescue treatment, if needed.    The longitudinal plan of care for Marine was addressed during this visit. Due to the added complexity in care, I will continue to support Marine in the subsequent management of this condition(s) and with the ongoing continuity of care of this condition(s).        Again, thank you for allowing me to participate in the care of your patient.      Sincerely,    Kalpana Marshall MD

## 2024-07-14 ENCOUNTER — HEALTH MAINTENANCE LETTER (OUTPATIENT)
Age: 19
End: 2024-07-14

## 2024-12-04 DIAGNOSIS — G43.009 MIGRAINE WITHOUT AURA AND WITHOUT STATUS MIGRAINOSUS, NOT INTRACTABLE: ICD-10-CM

## 2024-12-05 RX ORDER — ONDANSETRON 4 MG/1
4 TABLET, ORALLY DISINTEGRATING ORAL EVERY 6 HOURS PRN
Qty: 20 TABLET | Refills: 11 | Status: SHIPPED | OUTPATIENT
Start: 2024-12-05

## 2024-12-05 RX ORDER — CEFUROXIME AXETIL 250 MG/1
6 TABLET ORAL
Qty: 9 ML | Refills: 11 | Status: SHIPPED | OUTPATIENT
Start: 2024-12-05

## 2024-12-05 NOTE — TELEPHONE ENCOUNTER
RX Authorization    Medication: SUMAtriptan (IMITREX STATDOSE) 6 MG/0.5ML pen injector kit    Date last refill ordered: 11/8/2023    Quantity ordered: 9 mL    # refills: 11    Date of last clinic visit with ordering provider: 5/8/2024    Date of next clinic visit with ordering provider:    All pertinent protocol data (lab date/result):    Include pertinent information from patients message:                                               RX Authorization    Medication: Ondansetron (ZOFRAN ODT) 4 MG ODT tab    Date last refill ordered: 11/8/2023    Quantity ordered: 20    # refills: 11    Date of last clinic visit with ordering provider: 5/8/2024    Date of next clinic visit with ordering provider:    All pertinent protocol data (lab date/result):    Include pertinent information from patients message:

## 2024-12-18 ENCOUNTER — TELEPHONE (OUTPATIENT)
Dept: OBGYN | Facility: CLINIC | Age: 19
End: 2024-12-18
Payer: COMMERCIAL

## 2025-05-12 DIAGNOSIS — G43.719 INTRACTABLE CHRONIC MIGRAINE WITHOUT AURA AND WITHOUT STATUS MIGRAINOSUS: Primary | ICD-10-CM

## 2025-05-13 ENCOUNTER — CARE COORDINATION (OUTPATIENT)
Dept: NEUROLOGY | Facility: CLINIC | Age: 20
End: 2025-05-13
Payer: COMMERCIAL

## 2025-05-13 ENCOUNTER — PATIENT OUTREACH (OUTPATIENT)
Dept: CARE COORDINATION | Facility: CLINIC | Age: 20
End: 2025-05-13
Payer: COMMERCIAL

## 2025-05-15 ENCOUNTER — PATIENT OUTREACH (OUTPATIENT)
Dept: CARE COORDINATION | Facility: CLINIC | Age: 20
End: 2025-05-15
Payer: COMMERCIAL

## 2025-07-19 ENCOUNTER — HEALTH MAINTENANCE LETTER (OUTPATIENT)
Age: 20
End: 2025-07-19